# Patient Record
(demographics unavailable — no encounter records)

---

## 2025-07-15 NOTE — PLAN
[FreeTextEntry1] : rx pnv  f/u in 1 year  Patient screened positive on the PHQ-2 questionnaire. Provided patient with a behavioral health navigation referral and directions on how to contact the program.

## 2025-07-15 NOTE — HISTORY OF PRESENT ILLNESS
[FreeTextEntry1] : new pateint  is trying to conceive has been in this country and with this partner for 3 months  a soon in 10/2024 shows a 2 cm cyst?  reg menses  G0  no med hx